# Patient Record
Sex: FEMALE | Race: OTHER | HISPANIC OR LATINO
[De-identification: names, ages, dates, MRNs, and addresses within clinical notes are randomized per-mention and may not be internally consistent; named-entity substitution may affect disease eponyms.]

---

## 2024-01-16 ENCOUNTER — NON-APPOINTMENT (OUTPATIENT)
Age: 64
End: 2024-01-16

## 2024-01-16 PROBLEM — Z00.00 ENCOUNTER FOR PREVENTIVE HEALTH EXAMINATION: Status: ACTIVE | Noted: 2024-01-16

## 2024-01-25 PROBLEM — N60.01 BILATERAL BREAST CYSTS: Status: ACTIVE | Noted: 2024-01-25

## 2024-01-30 ENCOUNTER — NON-APPOINTMENT (OUTPATIENT)
Age: 64
End: 2024-01-30

## 2024-01-31 ENCOUNTER — APPOINTMENT (OUTPATIENT)
Dept: BREAST CENTER | Facility: CLINIC | Age: 64
End: 2024-01-31
Payer: COMMERCIAL

## 2024-01-31 ENCOUNTER — NON-APPOINTMENT (OUTPATIENT)
Age: 64
End: 2024-01-31

## 2024-01-31 VITALS
DIASTOLIC BLOOD PRESSURE: 76 MMHG | WEIGHT: 190 LBS | HEART RATE: 80 BPM | HEIGHT: 66 IN | SYSTOLIC BLOOD PRESSURE: 117 MMHG | OXYGEN SATURATION: 97 % | BODY MASS INDEX: 30.53 KG/M2

## 2024-01-31 DIAGNOSIS — Z78.9 OTHER SPECIFIED HEALTH STATUS: ICD-10-CM

## 2024-01-31 DIAGNOSIS — N60.02 SOLITARY CYST OF RIGHT BREAST: ICD-10-CM

## 2024-01-31 DIAGNOSIS — Z80.41 FAMILY HISTORY OF MALIGNANT NEOPLASM OF OVARY: ICD-10-CM

## 2024-01-31 DIAGNOSIS — N60.01 SOLITARY CYST OF RIGHT BREAST: ICD-10-CM

## 2024-01-31 PROCEDURE — 99204 OFFICE O/P NEW MOD 45 MIN: CPT

## 2024-01-31 PROCEDURE — 76642 ULTRASOUND BREAST LIMITED: CPT | Mod: RT

## 2024-02-26 ENCOUNTER — RESULT REVIEW (OUTPATIENT)
Age: 64
End: 2024-02-26

## 2024-02-26 ENCOUNTER — OUTPATIENT (OUTPATIENT)
Dept: OUTPATIENT SERVICES | Facility: HOSPITAL | Age: 64
LOS: 1 days | End: 2024-02-26
Payer: COMMERCIAL

## 2024-02-26 DIAGNOSIS — N63.10 UNSPECIFIED LUMP IN THE RIGHT BREAST, UNSPECIFIED QUADRANT: ICD-10-CM

## 2024-02-26 LAB — SURGICAL PATHOLOGY STUDY: SIGNIFICANT CHANGE UP

## 2024-02-26 PROCEDURE — 88321 CONSLTJ&REPRT SLD PREP ELSWR: CPT

## 2024-02-26 NOTE — CONSULT LETTER
[Dear  ___] : Dear ~ROBSON, [Consult Letter:] : I had the pleasure of evaluating your patient, [unfilled]. [Please see my note below.] : Please see my note below. [Consult Closing:] : Thank you very much for allowing me to participate in the care of this patient.  If you have any questions, please do not hesitate to contact me. [Sincerely,] : Sincerely, [FreeTextEntry2] : Dr. Rogers [FreeTextEntry3] : Fadi Herrera MD Chief of Breast Surgery Director of Breast Cancer Program [facility]

## 2024-02-26 NOTE — DATA REVIEWED
[FreeTextEntry1] : 10/9/2008 ([facility]) Right breast mass Core biopsy: fibroadenoma   8/16/2013 ([facility]) B/L Core biopsies x2 sites: - R 6:00 periareolar: fibroadenoma containing fibrocystic changes and sclerosing adenosis - L 10:00: partially myxoid fibroadenoma containing fibrocystic changes  8/9/2019 ([facility]) Surgical Path B/L Core biopsy x2 sites: -  R 12:00 2cmfn: fibroadenoma with florid usual ductal hyperplasia, fibrocystic changes with calcifications -  L 6:00 1cmfn: fibroadenoma  1/11/24 ([facility]) B/L sMMG/US: heterogeneously dense. Probably benign sonographic findings in both breasts (see full report), for which 6 month follow-up bilateral breast sonographic examination in July 2024 is recommended to ensure stability. FOLLOW-UP: 6 month follow-up bilateral breast sonographic examination in July 2024. BI-RADS 3:  Probably benign.

## 2024-02-26 NOTE — PROCEDURE
[FreeTextEntry1] : Right breast US [FreeTextEntry2] : Two R breast palpable masses [FreeTextEntry3] : Technique: a targeted RIGHT breast ultrasound was performed with evaluation of the lower inner quadrant and retroareolar region. US Findings: - Right breast benign appearing mass at 8:00 2cmFN was detected, 3.01cm x 3.45cm transverse by 2.77cm x 3.44cm longitudinal in dimension. - Right breast benign appearing mass at 9:00 2cmFN was detected, 2.15cm x 2.29cm transverse by 2.81cm x 4.48cm longitudinal in dimension. No suspicious solid or complex cystic masses were detected.

## 2024-02-26 NOTE — PAST MEDICAL HISTORY
[Menarche Age ____] : age at menarche was [unfilled] [Menopause Age____] : age at menopause was [unfilled] [Total Preg ___] : G[unfilled] [Live Births ___] : P[unfilled]  [Age At Live Birth ___] : Age at live birth: [unfilled] [History of Hormone Replacement Treatment] : has no history of hormone replacement treatment [FreeTextEntry7] : No [FreeTextEntry6] : No [FreeTextEntry8] : No

## 2024-02-26 NOTE — ASSESSMENT
[FreeTextEntry1] : 63 year old female presents for initial evaluation regarding BIRADS-3 B/L sMMG/US 1/11/24 which revealed probably benign sonographic findings in bilateral breasts.  KATIA 16%. Physical exam today yields two nodularity's R retroareolar 8-9:00 region. Bedside US performed today yields same findings, two benign likely fibroadenomas in R breast at area of patient's painful palpable concern. Due to patients worsening complaints of two constantly palpable and heavy breast masses with reportedly increased size on palpation, amendable for excision. Reviewed patients imaging in detail and answered all questions. Plan for R US-guided excision of two breast masses, pending PCP clearance. Slide review pending. Surgical consent obtained today; surgical coordinator aware. Patient verbalized understanding and agreement of plan.

## 2024-02-26 NOTE — HISTORY OF PRESENT ILLNESS
[FreeTextEntry1] : 63-year-old female was referred by Dr. Rogers who presents for initial evaluation regarding BIRADS-3 imaging; B/L sMMG/US on 1/11/24 revealed probably benign sonographic findings in both breasts, for which 6-month follow-up bilateral breast sonographic examination in July 2024 is recommended to ensure stability. Patient with a history of two biopsy-proven fibroadenomas in 2019 at R 12:00 2cmfn and L 6:00 1cmfn. Patient states she can feel these lumps in her Right breast for 5 years, intermittently associated with pain, with worsening pain noted more recently. Patient denies nipple discharge or skin changes.  Patient with history of multiple bilateral core biopsies. Reports benign right breast biopsies at [facility] in 2015 and 2018. Pending fax of all biopsy reports.  Patient reports family history of breast cancer in [place] age 40 and ovarian cancer in same [place] age 43. Patient has not had genetic testing performed.  Leti Lifetime Risk 16%

## 2024-02-28 ENCOUNTER — TRANSCRIPTION ENCOUNTER (OUTPATIENT)
Age: 64
End: 2024-02-28

## 2024-02-28 NOTE — ASU PATIENT PROFILE, ADULT - FALL HARM RISK - UNIVERSAL INTERVENTIONS
Bed in lowest position, wheels locked, appropriate side rails in place/Call bell, personal items and telephone in reach/Instruct patient to call for assistance before getting out of bed or chair/Non-slip footwear when patient is out of bed/[place] to call system/Physically safe environment - no spills, clutter or unnecessary equipment/Purposeful Proactive Rounding/Room/bathroom lighting operational, light cord in reach

## 2024-02-28 NOTE — ASU PATIENT PROFILE, ADULT - NSICDXPASTMEDICALHX_GEN_ALL_CORE_FT
PAST MEDICAL HISTORY:  Asthma     Hyperlipidemia      PAST MEDICAL HISTORY:  Asthma Well controlled    Hyperlipidemia Per Pt No RX started as of yet

## 2024-02-28 NOTE — ASU PATIENT PROFILE, ADULT - NS PREOP UNDERSTANDS INFO
No solid food/dairy/candy/gum after midnight; water allowed before 06:30am tomorrow; patient reminded to come with photo ID/insurance/credit card; dress in comfortable clothes; no jewelries/contact lens/valuable; no smoking/alcohol drinking/recreational drug use today; escort to have photo ID; address and callback number was given;/yes

## 2024-02-28 NOTE — ASU PATIENT PROFILE, ADULT - NSICDXPASTSURGICALHX_GEN_ALL_CORE_FT
PAST SURGICAL HISTORY:  H/O breast biopsy right    H/O  section X2    H/O fracture of leg ORIF    H/O removal of cyst right, X2    History of cholecystectomy      PAST SURGICAL HISTORY:  H/O breast biopsy right    H/O  section X2    H/O fracture of leg ORIF      H/O removal of cyst right breast, X2    History of cholecystectomy     S/P tonsillectomy

## 2024-02-29 ENCOUNTER — RESULT REVIEW (OUTPATIENT)
Age: 64
End: 2024-02-29

## 2024-02-29 ENCOUNTER — OUTPATIENT (OUTPATIENT)
Dept: OUTPATIENT SERVICES | Facility: HOSPITAL | Age: 64
LOS: 1 days | Discharge: HOME | End: 2024-02-29
Payer: COMMERCIAL

## 2024-02-29 ENCOUNTER — TRANSCRIPTION ENCOUNTER (OUTPATIENT)
Age: 64
End: 2024-02-29

## 2024-02-29 ENCOUNTER — APPOINTMENT (OUTPATIENT)
Dept: BREAST CENTER | Facility: AMBULATORY SURGERY CENTER | Age: 64
End: 2024-02-29

## 2024-02-29 VITALS
SYSTOLIC BLOOD PRESSURE: 123 MMHG | DIASTOLIC BLOOD PRESSURE: 71 MMHG | WEIGHT: 187.39 LBS | HEART RATE: 66 BPM | RESPIRATION RATE: 15 BRPM | TEMPERATURE: 98 F | HEIGHT: 66 IN | OXYGEN SATURATION: 100 %

## 2024-02-29 VITALS
RESPIRATION RATE: 16 BRPM | SYSTOLIC BLOOD PRESSURE: 104 MMHG | DIASTOLIC BLOOD PRESSURE: 65 MMHG | OXYGEN SATURATION: 96 % | HEART RATE: 87 BPM | TEMPERATURE: 98 F

## 2024-02-29 DIAGNOSIS — Z98.890 OTHER SPECIFIED POSTPROCEDURAL STATES: Chronic | ICD-10-CM

## 2024-02-29 DIAGNOSIS — Z87.81 PERSONAL HISTORY OF (HEALED) TRAUMATIC FRACTURE: Chronic | ICD-10-CM

## 2024-02-29 DIAGNOSIS — Z90.49 ACQUIRED ABSENCE OF OTHER SPECIFIED PARTS OF DIGESTIVE TRACT: Chronic | ICD-10-CM

## 2024-02-29 DIAGNOSIS — Z98.891 HISTORY OF UTERINE SCAR FROM PREVIOUS SURGERY: Chronic | ICD-10-CM

## 2024-02-29 DIAGNOSIS — Z90.89 ACQUIRED ABSENCE OF OTHER ORGANS: Chronic | ICD-10-CM

## 2024-02-29 PROCEDURE — 19120 REMOVAL OF BREAST LESION: CPT | Mod: RT

## 2024-02-29 PROCEDURE — 76098 X-RAY EXAM SURGICAL SPECIMEN: CPT | Mod: 26

## 2024-02-29 PROCEDURE — 88307 TISSUE EXAM BY PATHOLOGIST: CPT | Mod: 26

## 2024-02-29 RX ORDER — DOCUSATE SODIUM 100 MG
1 CAPSULE ORAL
Qty: 45 | Refills: 0
Start: 2024-02-29 | End: 2024-03-14

## 2024-02-29 RX ORDER — METOCLOPRAMIDE HCL 10 MG
10 TABLET ORAL ONCE
Refills: 0 | Status: DISCONTINUED | OUTPATIENT
Start: 2024-02-29 | End: 2024-02-29

## 2024-02-29 RX ORDER — OXYCODONE HYDROCHLORIDE 5 MG/1
1 TABLET ORAL
Qty: 4 | Refills: 0
Start: 2024-02-29

## 2024-02-29 RX ORDER — FENTANYL CITRATE 50 UG/ML
25 INJECTION INTRAVENOUS
Refills: 0 | Status: DISCONTINUED | OUTPATIENT
Start: 2024-02-29 | End: 2024-02-29

## 2024-02-29 RX ORDER — ACETAMINOPHEN 500 MG
1000 TABLET ORAL ONCE
Refills: 0 | Status: COMPLETED | OUTPATIENT
Start: 2024-02-29 | End: 2024-02-29

## 2024-02-29 RX ORDER — ALBUTEROL 90 UG/1
2 AEROSOL, METERED ORAL
Refills: 0 | DISCHARGE

## 2024-02-29 RX ORDER — HYDROMORPHONE HYDROCHLORIDE 2 MG/ML
0.2 INJECTION INTRAMUSCULAR; INTRAVENOUS; SUBCUTANEOUS
Refills: 0 | Status: DISCONTINUED | OUTPATIENT
Start: 2024-02-29 | End: 2024-02-29

## 2024-02-29 RX ORDER — OXYCODONE HYDROCHLORIDE 5 MG/1
5 TABLET ORAL ONCE
Refills: 0 | Status: DISCONTINUED | OUTPATIENT
Start: 2024-02-29 | End: 2024-02-29

## 2024-02-29 RX ORDER — IBUPROFEN 200 MG
2 TABLET ORAL
Refills: 0 | DISCHARGE

## 2024-02-29 RX ORDER — SODIUM CHLORIDE 9 MG/ML
500 INJECTION, SOLUTION INTRAVENOUS
Refills: 0 | Status: DISCONTINUED | OUTPATIENT
Start: 2024-02-29 | End: 2024-02-29

## 2024-02-29 RX ORDER — BUDESONIDE AND FORMOTEROL FUMARATE DIHYDRATE 160; 4.5 UG/1; UG/1
1 AEROSOL RESPIRATORY (INHALATION)
Refills: 0 | DISCHARGE

## 2024-02-29 RX ORDER — ONDANSETRON 8 MG/1
4 TABLET, FILM COATED ORAL ONCE
Refills: 0 | Status: DISCONTINUED | OUTPATIENT
Start: 2024-02-29 | End: 2024-02-29

## 2024-02-29 RX ADMIN — SODIUM CHLORIDE 100 MILLILITER(S): 9 INJECTION, SOLUTION INTRAVENOUS at 12:46

## 2024-02-29 RX ADMIN — Medication 1000 MILLIGRAM(S): at 08:36

## 2024-02-29 NOTE — ASU DISCHARGE PLAN (ADULT/PEDIATRIC) - ASU DC SPECIAL INSTRUCTIONSFT
- You may resume a regular diet as tolerated  - You may shower, let the water run over the dressings, but refrain from excessive scrubbing, or soaking  - Please wear support bra at all times, except when showering  - You may take Tylenol, 1 gram every 6 hours for pain. Prescriptions for additional pain medication has been sent to your pharmacy. Only take for breakthrough pain  - Please follow up with the surgeon in the office, call for an appointment

## 2024-02-29 NOTE — PRE-ANESTHESIA EVALUATION ADULT - NSANTHPMHFT_GEN_ALL_CORE
64yo F BMI 30 with h/o well-controlled asthma compliant with daily maintenance inhalers (has not used prn albuterol in several months; denies h/o intubation/hospitalization for exacerbation) presenting for right breast lumpectomy under ultrasound guidance.

## 2024-02-29 NOTE — BRIEF OPERATIVE NOTE - OPERATION/FINDINGS
Breast mass identified via US localization pre-op. Mass excised through incision. Hemostasis achieved. Incision closed primarily.

## 2024-03-05 LAB — SURGICAL PATHOLOGY STUDY: SIGNIFICANT CHANGE UP

## 2024-03-11 PROBLEM — D24.1 FIBROADENOMA OF RIGHT BREAST: Status: ACTIVE | Noted: 2024-03-11

## 2024-03-12 PROBLEM — J45.909 UNSPECIFIED ASTHMA, UNCOMPLICATED: Chronic | Status: ACTIVE | Noted: 2024-02-28

## 2024-03-12 PROBLEM — E78.5 HYPERLIPIDEMIA, UNSPECIFIED: Chronic | Status: ACTIVE | Noted: 2024-02-28

## 2024-03-13 ENCOUNTER — APPOINTMENT (OUTPATIENT)
Dept: BREAST CENTER | Facility: CLINIC | Age: 64
End: 2024-03-13
Payer: COMMERCIAL

## 2024-03-13 VITALS
SYSTOLIC BLOOD PRESSURE: 85 MMHG | HEART RATE: 67 BPM | BODY MASS INDEX: 30.05 KG/M2 | HEIGHT: 66 IN | WEIGHT: 187 LBS | DIASTOLIC BLOOD PRESSURE: 54 MMHG

## 2024-03-13 DIAGNOSIS — R92.8 OTHER ABNORMAL AND INCONCLUSIVE FINDINGS ON DIAGNOSTIC IMAGING OF BREAST: ICD-10-CM

## 2024-03-13 DIAGNOSIS — D24.1 BENIGN NEOPLASM OF RIGHT BREAST: ICD-10-CM

## 2024-03-13 DIAGNOSIS — Z87.09 PERSONAL HISTORY OF OTHER DISEASES OF THE RESPIRATORY SYSTEM: ICD-10-CM

## 2024-03-13 DIAGNOSIS — Z80.3 FAMILY HISTORY OF MALIGNANT NEOPLASM OF BREAST: ICD-10-CM

## 2024-03-13 PROCEDURE — 99024 POSTOP FOLLOW-UP VISIT: CPT

## 2024-03-13 NOTE — HISTORY OF PRESENT ILLNESS
[FreeTextEntry1] : 63 year old, patient of Dr. Rogers, presents for post-op evaluation s/p US-guided excision of ght breast lumps x2 on 2/29/24, patient reported they were uncomfortable, constantly palpable and heavy breast masses with reportedly increased size on palpation.   Patient presented on 1/31/24 s/p BIRADS-3 imaging; B/L sMMG/US on 1/11/24 revealed probably benign sonographic findings in both breasts, with recommendation for 6 month follow-up bilateral breast sonographic examination in July 2024. Patient with a history of two biopsy-proven fibroadenomas in 2019 at R 12:00 2cmfn and L 6:00 1cmfn. Patient stated she could feel the lumps in her Right breast for 5 years, intermittently associated with pain, with worsening pain noted more recently. Patient with history of multiple bilateral core biopsies. Reports benign right breast biopsies (fibroadenomas) at [facility] in 2015 and 2018.    Final surgical pathology of both the RIGHT 8:00 mass excision and RIGHT 9:00 mass excision yielded myxoid fibroadenomas.   Denies any fever, chills, redness, pain, or discharge at the incision site.  Patient reports family history of breast cancer in [place] age 40 and ovarian cancer in same [place] age 43. Leti Lifetime Risk 16%

## 2024-03-13 NOTE — DATA REVIEWED
[FreeTextEntry1] : 10/9/2008 ([facility]) Right breast mass Core biopsy: fibroadenoma   8/16/2013 ([facility]) B/L Core biopsies x2 sites: - R 6:00 periareolar: fibroadenoma containing fibrocystic changes and sclerosing adenosis - L 10:00: partially myxoid fibroadenoma containing fibrocystic changes  8/9/2019 ([facility]) Surgical Path B/L Core biopsy x2 sites: -  R 12:00 2cmfn: fibroadenoma with florid usual ductal hyperplasia, fibrocystic changes with calcifications -  L 6:00 1cmfn: fibroadenoma  1/11/24 ([facility]) B/L sMMG/US: heterogeneously dense. Probably benign sonographic findings in both breasts (see full report), for which 6 month follow-up bilateral breast sonographic examination in July 2024 is recommended to ensure stability. FOLLOW-UP: 6 month follow-up bilateral breast sonographic examination in July 2024. BI-RADS 3:  Probably benign.   2/29/24 ([facility])  RIGHT BREAST MASS 8:00 EXCISION: Myxoid fibroadenoma. RIGHT BREAST MASS 9:00 EXCISION: Myxoid fibroadenoma.

## 2024-03-13 NOTE — ASSESSMENT
[FreeTextEntry1] : 63 year old presents for post-op evaluation s/p US-guided excision of uncomfortable right breast lumps x2 on 2/29/24.    Final surgical pathology of both the RIGHT 8:00 mass excision and RIGHT 9:00 mass excision yielded myxoid fibroadenomas,  KATIA 16%. Patient is doing well post-operatively. Suture ends were cut and steri-strips were reapplied. Advised application of warm, wet compresses for symptomatic relief for tenderness. Most recent imaging again reviewed: B/L sMMG/US 1/31/24 BIRADS-3 revealed probably benign sonographic findings in both breasts. Plan for bilateral US and re-examination in July 2024. Patient verbalizes understanding and is in agreement with the plan.

## 2024-03-13 NOTE — PHYSICAL EXAM
[Supple] : supple [No Supraclavicular Adenopathy] : no supraclavicular adenopathy [Examined in the supine and seated position] : examined in the supine and seated position [No dominant masses] : no dominant masses left breast [No Nipple Retraction] : no left nipple retraction [No Nipple Discharge] : no left nipple discharge [No Axillary Lymphadenopathy] : no left axillary lymphadenopathy [No dominant masses] : no dominant masses in right breast  [de-identified] : periareolar incision c/d/i

## 2024-03-15 ENCOUNTER — TRANSCRIPTION ENCOUNTER (OUTPATIENT)
Age: 64
End: 2024-03-15

## 2024-03-15 VITALS
TEMPERATURE: 97 F | HEART RATE: 72 BPM | OXYGEN SATURATION: 99 % | RESPIRATION RATE: 20 BRPM | HEIGHT: 66 IN | WEIGHT: 188.27 LBS | SYSTOLIC BLOOD PRESSURE: 118 MMHG | DIASTOLIC BLOOD PRESSURE: 77 MMHG

## 2024-03-15 NOTE — ASU PREOP CHECKLIST - BSA (M2)
Per patient, she called her insurance herself and they told her this provider is in-network. Will need minimum 25 visits. 1.95

## 2024-03-15 NOTE — ASU PATIENT PROFILE, ADULT - FALL HARM RISK - UNIVERSAL INTERVENTIONS
Bed in lowest position, wheels locked, appropriate side rails in place/Call bell, personal items and telephone in reach/Instruct patient to call for assistance before getting out of bed or chair/Non-slip footwear when patient is out of bed/[place] to call system/Physically safe environment - no spills, clutter or unnecessary equipment/Purposeful Proactive Rounding/Room/bathroom lighting operational, light cord in reach

## 2024-03-15 NOTE — ASU PATIENT PROFILE, ADULT - NSICDXPASTSURGICALHX_GEN_ALL_CORE_FT
PAST SURGICAL HISTORY:  H/O breast biopsy right    H/O  section X2    H/O fracture of leg ORIF      H/O removal of cyst right breast, X2    History of cholecystectomy     S/P tonsillectomy      PAST SURGICAL HISTORY:  H/O breast biopsy right 26    H/O  section X2    H/O fracture of leg ORIF      H/O removal of cyst right breast, X2    History of cholecystectomy     S/P tonsillectomy as child

## 2024-03-15 NOTE — ASU PATIENT PROFILE, ADULT - NS TRANSFER RESPONSE BELONGING
Received faxed referral for [facility] from patient's PCP. Please schedule in memory clinic with Dr. Hdz and geriatrician. Thank you.      yes

## 2024-03-16 ENCOUNTER — OUTPATIENT (OUTPATIENT)
Dept: OUTPATIENT SERVICES | Facility: HOSPITAL | Age: 64
LOS: 1 days | Discharge: HOME | End: 2024-03-16
Payer: COMMERCIAL

## 2024-03-16 ENCOUNTER — TRANSCRIPTION ENCOUNTER (OUTPATIENT)
Age: 64
End: 2024-03-16

## 2024-03-16 VITALS — RESPIRATION RATE: 12 BRPM | HEART RATE: 70 BPM | TEMPERATURE: 98 F | OXYGEN SATURATION: 100 %

## 2024-03-16 DIAGNOSIS — Z98.890 OTHER SPECIFIED POSTPROCEDURAL STATES: Chronic | ICD-10-CM

## 2024-03-16 DIAGNOSIS — Z87.81 PERSONAL HISTORY OF (HEALED) TRAUMATIC FRACTURE: Chronic | ICD-10-CM

## 2024-03-16 DIAGNOSIS — Z98.891 HISTORY OF UTERINE SCAR FROM PREVIOUS SURGERY: Chronic | ICD-10-CM

## 2024-03-16 DIAGNOSIS — Z90.49 ACQUIRED ABSENCE OF OTHER SPECIFIED PARTS OF DIGESTIVE TRACT: Chronic | ICD-10-CM

## 2024-03-16 DIAGNOSIS — Z90.89 ACQUIRED ABSENCE OF OTHER ORGANS: Chronic | ICD-10-CM

## 2024-03-16 LAB
BLD GP AB SCN SERPL QL: NEGATIVE — SIGNIFICANT CHANGE UP
RH IG SCN BLD-IMP: POSITIVE — SIGNIFICANT CHANGE UP

## 2024-03-16 PROCEDURE — 86850 RBC ANTIBODY SCREEN: CPT

## 2024-03-16 PROCEDURE — 88305 TISSUE EXAM BY PATHOLOGIST: CPT

## 2024-03-16 PROCEDURE — 86901 BLOOD TYPING SEROLOGIC RH(D): CPT

## 2024-03-16 PROCEDURE — 58561 HYSTEROSCOPY REMOVE MYOMA: CPT

## 2024-03-16 PROCEDURE — 86900 BLOOD TYPING SEROLOGIC ABO: CPT

## 2024-03-16 PROCEDURE — 88305 TISSUE EXAM BY PATHOLOGIST: CPT | Mod: 26

## 2024-03-16 DEVICE — MYOSURE TISSUE REMOVAL DEVICE REACH: Type: IMPLANTABLE DEVICE | Status: FUNCTIONAL

## 2024-03-16 DEVICE — MYOSURE TISSUE REMOVAL DEVICE XL: Type: IMPLANTABLE DEVICE | Status: FUNCTIONAL

## 2024-03-16 RX ORDER — BUDESONIDE AND FORMOTEROL FUMARATE DIHYDRATE 160; 4.5 UG/1; UG/1
2 AEROSOL RESPIRATORY (INHALATION)
Refills: 0 | DISCHARGE

## 2024-03-16 RX ORDER — ALBUTEROL 90 UG/1
2 AEROSOL, METERED ORAL
Refills: 0 | DISCHARGE

## 2024-03-16 RX ORDER — SODIUM CHLORIDE 9 MG/ML
1000 INJECTION, SOLUTION INTRAVENOUS
Refills: 0 | Status: DISCONTINUED | OUTPATIENT
Start: 2024-03-16 | End: 2024-03-16

## 2024-03-16 RX ORDER — ACETAMINOPHEN 500 MG
1000 TABLET ORAL EVERY 6 HOURS
Refills: 0 | Status: DISCONTINUED | OUTPATIENT
Start: 2024-03-16 | End: 2024-03-16

## 2024-03-16 RX ORDER — ONDANSETRON 8 MG/1
8 TABLET, FILM COATED ORAL EVERY 8 HOURS
Refills: 0 | Status: DISCONTINUED | OUTPATIENT
Start: 2024-03-16 | End: 2024-03-16

## 2024-03-16 RX ORDER — SODIUM CHLORIDE 9 MG/ML
1000 INJECTION INTRAMUSCULAR; INTRAVENOUS; SUBCUTANEOUS
Refills: 0 | Status: DISCONTINUED | OUTPATIENT
Start: 2024-03-16 | End: 2024-03-16

## 2024-03-16 RX ORDER — SIMETHICONE 80 MG/1
80 TABLET, CHEWABLE ORAL EVERY 6 HOURS
Refills: 0 | Status: DISCONTINUED | OUTPATIENT
Start: 2024-03-16 | End: 2024-03-16

## 2024-03-16 RX ADMIN — Medication 1000 MILLIGRAM(S): at 13:59

## 2024-03-16 RX ADMIN — SIMETHICONE 80 MILLIGRAM(S): 80 TABLET, CHEWABLE ORAL at 12:40

## 2024-03-16 RX ADMIN — Medication 1000 MILLIGRAM(S): at 12:40

## 2024-03-16 NOTE — BRIEF OPERATIVE NOTE - NSICDXBRIEFPREOP_GEN_ALL_CORE_FT
PRE-OP DIAGNOSIS:  Menorrhagia with irregular cycle 16-Mar-2024 11:21:19  Arabella Rogers  
Heterosexual

## 2024-03-16 NOTE — ASU DISCHARGE PLAN (ADULT/PEDIATRIC) - SIGNS AND SYMPTOMS OF INFECTION: FEVER, REDNESS, SWELLING, FOUL SMELLING DISCHARGE
Physical Therapy                 Therapy Communication Note    Patient Name: Isael Haji  MRN: [id number]  Today's Date: 11/6/2023     Discipline: Physical Therapy    Missed Visit Reason:      Missed Time: Cancel    Comment:  
Statement Selected

## 2024-03-16 NOTE — DISCHARGE NOTE NURSING/CASE MANAGEMENT/SOCIAL WORK - PATIENT PORTAL LINK FT
You can access the FollowMyHealth Patient Portal offered by [facility] by registering at the following website: [email or web address]. By joining [employer]’s FollowMyHealth portal, you will also be able to view your health information using other applications (apps) compatible with our system.

## 2024-03-16 NOTE — ASU DISCHARGE PLAN (ADULT/PEDIATRIC) - CARE PROVIDER_API CALL
Arabella Rogers  Obstetrics and Gynecology  [address], [address]  [place] [address]  Phone: [phone]  Fax: [phone]  Follow Up Time:

## 2024-03-22 LAB — SURGICAL PATHOLOGY STUDY: SIGNIFICANT CHANGE UP

## 2024-07-31 ENCOUNTER — APPOINTMENT (OUTPATIENT)
Dept: BREAST CENTER | Facility: CLINIC | Age: 64
End: 2024-07-31
Payer: COMMERCIAL

## 2024-07-31 VITALS
WEIGHT: 190.25 LBS | DIASTOLIC BLOOD PRESSURE: 64 MMHG | HEIGHT: 66 IN | HEART RATE: 60 BPM | SYSTOLIC BLOOD PRESSURE: 93 MMHG | BODY MASS INDEX: 30.58 KG/M2

## 2024-07-31 DIAGNOSIS — R92.8 OTHER ABNORMAL AND INCONCLUSIVE FINDINGS ON DIAGNOSTIC IMAGING OF BREAST: ICD-10-CM

## 2024-07-31 DIAGNOSIS — D24.1 BENIGN NEOPLASM OF RIGHT BREAST: ICD-10-CM

## 2024-07-31 PROCEDURE — 99213 OFFICE O/P EST LOW 20 MIN: CPT

## 2024-08-02 NOTE — PHYSICAL EXAM
[Supple] : supple [No Supraclavicular Adenopathy] : no supraclavicular adenopathy [Examined in the supine and seated position] : examined in the supine and seated position [No dominant masses] : no dominant masses in right breast  [No dominant masses] : no dominant masses left breast [No Nipple Retraction] : no left nipple retraction [No Nipple Discharge] : no left nipple discharge [No Axillary Lymphadenopathy] : no left axillary lymphadenopathy [de-identified] : periareolar incision c/d/i

## 2024-08-02 NOTE — HISTORY OF PRESENT ILLNESS
[FreeTextEntry1] : 63 year old, patient of Dr. Rogers, presents for follow up s/p US-guided excision of right breast lumps x2 on 2/29/24, patient reported they were uncomfortable, constantly palpable and heavy breast masses with reportedly increased size on palpation.   Patient presented on 1/31/24 s/p BIRADS-3 imaging; B/L sMMG/US on 1/11/24 revealed probably benign sonographic findings in both breasts, with recommendation for 6 month follow-up bilateral breast sonographic examination in July 2024. Patient with a history of two biopsy-proven fibroadenomas in 2019 at R 12:00 2cmfn and L 6:00 1cmfn. Patient stated she could feel the lumps in her Right breast for 5 years, intermittently associated with pain, with worsening pain noted more recently. Patient with history of multiple bilateral core biopsies. Reports benign right breast biopsies (fibroadenomas) at [facility] in 2015 and 2018.    Final surgical pathology of both the RIGHT 8:00 mass excision and RIGHT 9:00 mass excision yielded myxoid fibroadenomas.   Most recent imaging: B/L targeted US 7/26/24 BIRADS-3 again revealed probably benign bilateral sonographic lesions, with recommendation for short term f/u US at time of b/l annual mammography.  Patient reports family history of breast cancer in [place] age 40 and ovarian cancer in same [place] age 43. Leti Lifetime Risk 16%

## 2024-08-02 NOTE — DATA REVIEWED
[FreeTextEntry1] : 10/9/2008 ([facility]) Right breast mass Core biopsy: fibroadenoma   8/16/2013 ([facility]) B/L Core biopsies x2 sites: - R 6:00 periareolar: fibroadenoma containing fibrocystic changes and sclerosing adenosis - L 10:00: partially myxoid fibroadenoma containing fibrocystic changes  8/9/2019 ([facility]) Surgical Path B/L Core biopsy x2 sites: -  R 12:00 2cmfn: fibroadenoma with florid usual ductal hyperplasia, fibrocystic changes with calcifications -  L 6:00 1cmfn: fibroadenoma  1/11/24 ([facility]) B/L sMMG/US: heterogeneously dense. Probably benign sonographic findings in both breasts (see full report), for which 6 month follow-up bilateral breast sonographic examination in July 2024 is recommended to ensure stability. FOLLOW-UP: 6 month follow-up bilateral breast sonographic examination in July 2024. BI-RADS 3:  Probably benign.   2/29/24 ([facility])  RIGHT BREAST MASS 8:00 EXCISION: Myxoid fibroadenoma. RIGHT BREAST MASS 9:00 EXCISION: Myxoid fibroadenoma.  7/26/24 ([facility]) B/L targeted US: 6 month follow-up ultrasound is recommended for probably benign bilateral sonographic lesions (see full report). At that time, the patient will be due for bilateral annual mammography. BI-RADS Category 3:  Probably benign.

## 2024-08-02 NOTE — DATA REVIEWED
[FreeTextEntry1] : 10/9/2008 ([facility]) Right breast mass Core biopsy: fibroadenoma   8/16/2013 ([facility]) B/L Core biopsies x2 sites: - R 6:00 periareolar: fibroadenoma containing fibrocystic changes and sclerosing adenosis - L 10:00: partially myxoid fibroadenoma containing fibrocystic changes  8/9/2019 ([facility]) Surgical Path B/L Core biopsy x2 sites: -  R 12:00 2cmfn: fibroadenoma with florid usual ductal hyperplasia, fibrocystic changes with calcifications -  L 6:00 1cmfn: fibroadenoma  1/11/24 ([facility]) B/L sMMG/US: heterogeneously dense. Probably benign sonographic findings in both breasts (see full report), for which 6 month follow-up bilateral breast sonographic examination in July 2024 is recommended to ensure stability. FOLLOW-UP: 6 month follow-up bilateral breast sonographic examination in July 2024. BI-RADS 3:  Probably benign.   2/29/24 ([facility])  RIGHT BREAST MASS 8:00 EXCISION: Myxoid fibroadenoma. RIGHT BREAST MASS 9:00 EXCISION: Myxoid fibroadenoma.  7/26/24 ([facility]) B/L targeted US: 6 month follow-up ultrasound is recommended for probably benign bilateral sonographic lesions (see full report). At that time, the patient will be due for bilateral annual mammography. BI-RADS Category 3:  Probably benign.

## 2024-08-02 NOTE — ASSESSMENT
[FreeTextEntry1] : 63 year old presents for follow up evaluation s/p US-guided excision of uncomfortable right breast lumps x2 on 2/29/24. Final surgical pathology of both the RIGHT 8:00 mass excision and RIGHT 9:00 mass excision yielded myxoid fibroadenomas,  KATIA 16%. Patient without complaint. Physical exam WNL. Most recent imaging reviewed: B/L targeted US 7/26/24 BIRADS-3 again revealed probably benign sonographic findings in both breasts. Plan for B/L sMMG/US and re-examination in Jan 2025. Patient verbalizes understanding and is in agreement with the plan.

## 2024-08-02 NOTE — DATA REVIEWED
[FreeTextEntry1] : 10/9/2008 ([facility]) Right breast mass Core biopsy: fibroadenoma   8/16/2013 ([facility]) B/L Core biopsies x2 sites: - R 6:00 periareolar: fibroadenoma containing fibrocystic changes and sclerosing adenosis - L 10:00: partially myxoid fibroadenoma containing fibrocystic changes  8/9/2019 ([facility]) Surgical Path B/L Core biopsy x2 sites: -  R 12:00 2cmfn: fibroadenoma with florid usual ductal hyperplasia, fibrocystic changes with calcifications -  L 6:00 1cmfn: fibroadenoma  1/11/24 ([facility]) B/L sMMG/US: heterogeneously dense. Probably benign sonographic findings in both breasts (see full report), for which 6 month follow-up bilateral breast sonographic examination in July 2024 is recommended to ensure stability. FOLLOW-UP: 6 month follow-up bilateral breast sonographic examination in July 2024. BI-RADS 3:  Probably benign.   2/29/24 ([facility])  RIGHT BREAST MASS 8:00 EXCISION: Myxoid fibroadenoma. RIGHT BREAST MASS 9:00 EXCISION: Myxoid fibroadenoma.  7/26/24 ([facility]) B/L targeted US: 6 month follow-up ultrasound is recommended for probably benign bilateral sonographic lesions (see full report). At that time, the patient will be due for bilateral annual mammography. BI-RADS Category 3:  Probably benign.

## 2024-08-02 NOTE — PHYSICAL EXAM
[Supple] : supple [No Supraclavicular Adenopathy] : no supraclavicular adenopathy [Examined in the supine and seated position] : examined in the supine and seated position [No dominant masses] : no dominant masses in right breast  [No dominant masses] : no dominant masses left breast [No Nipple Retraction] : no left nipple retraction [No Nipple Discharge] : no left nipple discharge [No Axillary Lymphadenopathy] : no left axillary lymphadenopathy [de-identified] : periareolar incision c/d/i

## 2024-08-02 NOTE — PHYSICAL EXAM
[Supple] : supple [No Supraclavicular Adenopathy] : no supraclavicular adenopathy [Examined in the supine and seated position] : examined in the supine and seated position [No dominant masses] : no dominant masses in right breast  [No dominant masses] : no dominant masses left breast [No Nipple Retraction] : no left nipple retraction [No Nipple Discharge] : no left nipple discharge [No Axillary Lymphadenopathy] : no left axillary lymphadenopathy [de-identified] : periareolar incision c/d/i

## 2024-08-02 NOTE — HISTORY OF PRESENT ILLNESS
[FreeTextEntry1] : 63 year old, patient of Dr. Rogers, presents for follow up s/p US-guided excision of right breast lumps x2 on 2/29/24, patient reported they were uncomfortable, constantly palpable and heavy breast masses with reportedly increased size on palpation.   Patient presented on 1/31/24 s/p BIRADS-3 imaging; B/L sMMG/US on 1/11/24 revealed probably benign sonographic findings in both breasts, with recommendation for 6 month follow-up bilateral breast sonographic examination in July 2024. Patient with a history of two biopsy-proven fibroadenomas in 2019 at R 12:00 2cmfn and L 6:00 1cmfn. Patient stated she could feel the lumps in her Right breast for 5 years, intermittently associated with pain, with worsening pain noted more recently. Patient with history of multiple bilateral core biopsies. Reports benign right breast biopsies (fibroadenomas) at [facility] in 2015 and 2018.    Final surgical pathology of both the RIGHT 8:00 mass excision and RIGHT 9:00 mass excision yielded myxoid fibroadenomas.   Most recent imaging: B/L targeted US 7/26/24 BIRADS-3 again revealed probably benign bilateral sonographic lesions, with recommendation for short term f/u US at time of b/l annual mammography.  Patient reports family history of breast cancer in [place] age 40 and ovarian cancer in same [place] age 43. Leti Lifetime Risk 16%

## 2024-08-02 NOTE — HISTORY OF PRESENT ILLNESS
[FreeTextEntry1] : 63 year old, patient of Dr. Rogers, presents for follow up s/p US-guided excision of right breast lumps x2 on 2/29/24, patient reported they were uncomfortable, constantly palpable and heavy breast masses with reportedly increased size on palpation.   Patient presented on 1/31/24 s/p BIRADS-3 imaging; B/L sMMG/US on 1/11/24 revealed probably benign sonographic findings in both breasts, with recommendation for 6 month follow-up bilateral breast sonographic examination in July 2024. Patient with a history of two biopsy-proven fibroadenomas in 2019 at R 12:00 2cmfn and L 6:00 1cmfn. Patient stated she could feel the lumps in her Right breast for 5 years, intermittently associated with pain, with worsening pain noted more recently. Patient with history of multiple bilateral core biopsies. Reports benign right breast biopsies (fibroadenomas) at [facility] in 2015 and 2018.    Final surgical pathology of both the RIGHT 8:00 mass excision and RIGHT 9:00 mass excision yielded myxoid fibroadenomas.   Most recent imaging: B/L targeted US 7/26/24 BIRADS-3 again revealed probably benign bilateral sonographic lesions, with recommendation for short term f/u US at time of b/l annual mammography.  Patient reports family history of breast cancer in [place] age 40 and ovarian cancer in same [place] age 43. Leti Lifetime Risk 16%

## 2025-02-11 ENCOUNTER — APPOINTMENT (OUTPATIENT)
Dept: BREAST CENTER | Facility: CLINIC | Age: 65
End: 2025-02-11

## (undated) DEVICE — MYOSURE CANISTER AQUILEX KIT

## (undated) DEVICE — MYOSURE SCOPE SEAL

## (undated) DEVICE — DRAPE IRRIGATION POUCH 19X23"

## (undated) DEVICE — PRESSURE INFUSOR BAG 3000ML

## (undated) DEVICE — TUBING TUR 2 PRONG

## (undated) DEVICE — SUT VICRYL 2-0 27" CT-1 UNDYED

## (undated) DEVICE — PACK GENERAL MINOR

## (undated) DEVICE — GLV 7.5 PROTEXIS (WHITE)

## (undated) DEVICE — DRAPE SPLIT SHEET 77" X 108"

## (undated) DEVICE — DRAPE TOWEL BLUE 17" X 24"

## (undated) DEVICE — ELCTR BOVIE TIP BLADE INSULATED 2.8" EDGE WITH SAFETY

## (undated) DEVICE — SOL IRR BAG NS 0.9% 3000ML

## (undated) DEVICE — DRSG TELFA 3 X 8

## (undated) DEVICE — TUBING AQUILEX OUTFLOW

## (undated) DEVICE — ELCTR PLASMA LOOP MEDIUM ANGLED 24FR 12-30 DEG

## (undated) DEVICE — BRA PINK LG 36-39"

## (undated) DEVICE — TUBING AQUILEX INFLOW

## (undated) DEVICE — VENODYNE/SCD SLEEVE CALF MEDIUM

## (undated) DEVICE — DRAPE TOP SHEET 53" X 101"

## (undated) DEVICE — PACK D&C

## (undated) DEVICE — GOWN TRIMAX XXL

## (undated) DEVICE — BRA PINK 2XL 42-45"

## (undated) DEVICE — SUT SILK 2-0 30" SH

## (undated) DEVICE — WARMING BLANKET UPPER ADULT

## (undated) DEVICE — BRA PINK 3XL 45-48"

## (undated) DEVICE — SUT VICRYL 4-0 27" SH UNDYED

## (undated) DEVICE — DRSG STERISTRIPS 0.5 X 4"

## (undated) DEVICE — DRAPE PROBE COVER 5" X 96"

## (undated) DEVICE — ELCTR PENCIL SMOKE EVACUATOR COATED PUSH BUTTON 70MM

## (undated) DEVICE — BRA PINK 4XL 48-51"

## (undated) DEVICE — BRA PINK XL 39-42"

## (undated) DEVICE — PREP CHLORAPREP HI-LITE ORANGE 26ML

## (undated) DEVICE — GLV 8 PROTEXIS (WHITE)

## (undated) DEVICE — BRA PINK MED 33-36"

## (undated) DEVICE — ELCTR BOVIE TIP BLADE MEGADYNE E-Z CLEAN 4" EXTENDED